# Patient Record
Sex: FEMALE | Race: WHITE | NOT HISPANIC OR LATINO | URBAN - METROPOLITAN AREA
[De-identification: names, ages, dates, MRNs, and addresses within clinical notes are randomized per-mention and may not be internally consistent; named-entity substitution may affect disease eponyms.]

---

## 2018-03-07 ENCOUNTER — OFFICE (OUTPATIENT)
Dept: URBAN - METROPOLITAN AREA CLINIC 75 | Facility: CLINIC | Age: 58
End: 2018-03-07
Payer: MEDICAID

## 2018-03-07 VITALS
DIASTOLIC BLOOD PRESSURE: 64 MMHG | HEART RATE: 72 BPM | HEIGHT: 61 IN | WEIGHT: 113 LBS | SYSTOLIC BLOOD PRESSURE: 102 MMHG

## 2018-03-07 DIAGNOSIS — Z12.11 ENCOUNTER FOR SCREENING FOR MALIGNANT NEOPLASM OF COLON: ICD-10-CM

## 2018-03-07 DIAGNOSIS — K62.3 RECTAL PROLAPSE: ICD-10-CM

## 2018-03-07 DIAGNOSIS — K59.00 CONSTIPATION, UNSPECIFIED: ICD-10-CM

## 2018-03-07 PROCEDURE — 99204 OFFICE O/P NEW MOD 45 MIN: CPT | Performed by: INTERNAL MEDICINE

## 2018-04-09 VITALS
DIASTOLIC BLOOD PRESSURE: 88 MMHG | DIASTOLIC BLOOD PRESSURE: 68 MMHG | SYSTOLIC BLOOD PRESSURE: 124 MMHG | WEIGHT: 113 LBS | SYSTOLIC BLOOD PRESSURE: 115 MMHG | HEART RATE: 81 BPM | RESPIRATION RATE: 16 BRPM | OXYGEN SATURATION: 98 % | HEART RATE: 72 BPM | RESPIRATION RATE: 19 BRPM | SYSTOLIC BLOOD PRESSURE: 103 MMHG | HEART RATE: 77 BPM | OXYGEN SATURATION: 97 % | HEART RATE: 73 BPM | HEART RATE: 87 BPM | RESPIRATION RATE: 18 BRPM | DIASTOLIC BLOOD PRESSURE: 77 MMHG | DIASTOLIC BLOOD PRESSURE: 80 MMHG | SYSTOLIC BLOOD PRESSURE: 118 MMHG | RESPIRATION RATE: 21 BRPM | DIASTOLIC BLOOD PRESSURE: 74 MMHG | OXYGEN SATURATION: 100 % | HEART RATE: 82 BPM | DIASTOLIC BLOOD PRESSURE: 82 MMHG | SYSTOLIC BLOOD PRESSURE: 116 MMHG | HEART RATE: 71 BPM | SYSTOLIC BLOOD PRESSURE: 109 MMHG | RESPIRATION RATE: 14 BRPM | TEMPERATURE: 97.4 F | DIASTOLIC BLOOD PRESSURE: 92 MMHG | HEART RATE: 80 BPM | OXYGEN SATURATION: 99 % | RESPIRATION RATE: 15 BRPM | SYSTOLIC BLOOD PRESSURE: 117 MMHG | RESPIRATION RATE: 20 BRPM | TEMPERATURE: 97.5 F | HEIGHT: 61 IN

## 2018-04-11 ENCOUNTER — AMBULATORY SURGICAL CENTER (OUTPATIENT)
Dept: URBAN - METROPOLITAN AREA SURGERY 17 | Facility: SURGERY | Age: 58
End: 2018-04-11
Payer: MEDICAID

## 2018-04-11 DIAGNOSIS — Z12.11 ENCOUNTER FOR SCREENING FOR MALIGNANT NEOPLASM OF COLON: ICD-10-CM

## 2018-04-11 PROCEDURE — 45378 DIAGNOSTIC COLONOSCOPY: CPT | Mod: 33,53 | Performed by: INTERNAL MEDICINE

## 2018-04-11 RX ADMIN — PROPOFOL 25 MG: 10 INJECTION, EMULSION INTRAVENOUS at 12:30

## 2018-04-11 RX ADMIN — PROPOFOL 25 MG: 10 INJECTION, EMULSION INTRAVENOUS at 12:20

## 2018-04-11 RX ADMIN — LIDOCAINE HYDROCHLORIDE 50 MG: 10 INJECTION, SOLUTION EPIDURAL; INFILTRATION; INTRACAUDAL; PERINEURAL at 12:17

## 2018-04-11 RX ADMIN — PROPOFOL 50 MG: 10 INJECTION, EMULSION INTRAVENOUS at 12:17

## 2018-04-11 RX ADMIN — PROPOFOL 25 MG: 10 INJECTION, EMULSION INTRAVENOUS at 12:22

## 2018-04-11 RX ADMIN — PROPOFOL 25 MG: 10 INJECTION, EMULSION INTRAVENOUS at 12:24

## 2018-04-11 NOTE — SERVICEHPINOTES
Thank you very much for allowing me to evaluate Ms. Scales. As you know she is a 57-year-old white female with severe mental impairment and cerebral palsy. She according to her caregiver in the past she's to have severe constipation, one point she was on lactulose, Dulcolax, fiber and enemas. Overtime she actually started having issues with increasingly smaller stools been loose stools. She was tapered off of her medication. She is now off of all laxatives and her caregiver says she has a formed stool typically every other day. With large bowel movements there is some blood on the outside of the stool but not mixed in the stool. There is no blood in the water does not drip. 2 weeks ago she tells me that look like when she went to the bathroom. Caregiver tells me look like her rectum "came out" and went back in. It sounds like she may have a prolapse. She's been having passage of large amounts of mucus at times. There is no known family history of polyps, colitis or colon cancer. She's never had colonoscopy.Her caregiver says she does have a history of reflux. She is on a puréed diet, she is a denture was. She tells me she has a good appetite. There is no history of rumination. There is no nausea or vomiting. There is no melena or hematemesis. She is not a smoker or drinker. She is nonverbal but does not appear to be in any acute distress. BR

## 2020-09-09 ENCOUNTER — OFFICE (OUTPATIENT)
Dept: URBAN - METROPOLITAN AREA CLINIC 75 | Facility: CLINIC | Age: 60
End: 2020-09-09
Payer: MEDICAID

## 2020-09-09 VITALS — HEIGHT: 61 IN | WEIGHT: 105 LBS | RESPIRATION RATE: 15 BRPM

## 2020-09-09 DIAGNOSIS — K62.3 RECTAL PROLAPSE: ICD-10-CM

## 2020-09-09 DIAGNOSIS — Z12.11 ENCOUNTER FOR SCREENING FOR MALIGNANT NEOPLASM OF COLON: ICD-10-CM

## 2020-09-09 DIAGNOSIS — K59.00 CONSTIPATION, UNSPECIFIED: ICD-10-CM

## 2020-09-09 DIAGNOSIS — K44.9 DIAPHRAGMATIC HERNIA WITHOUT OBSTRUCTION OR GANGRENE: ICD-10-CM

## 2020-09-09 DIAGNOSIS — R93.3 ABNORMAL FINDINGS ON DIAGNOSTIC IMAGING OF OTHER PARTS OF DI: ICD-10-CM

## 2020-09-09 PROCEDURE — 99214 OFFICE O/P EST MOD 30 MIN: CPT | Performed by: NURSE PRACTITIONER

## 2020-09-09 RX ORDER — LUBIPROSTONE 8 UG/1
CAPSULE, GELATIN COATED ORAL
Qty: 60 | Refills: 3 | Status: COMPLETED
Start: 2020-09-09 | End: 2022-03-10

## 2020-09-09 RX ORDER — POLYETHYLENE GLYCOL 3350 17 G/17G
17 POWDER, FOR SOLUTION ORAL
Qty: 1 | Refills: 11 | Status: COMPLETED
Start: 2020-09-09 | End: 2021-06-22

## 2021-01-20 ENCOUNTER — ON CAMPUS - OUTPATIENT (OUTPATIENT)
Dept: URBAN - METROPOLITAN AREA HOSPITAL 108 | Facility: HOSPITAL | Age: 61
End: 2021-01-20
Payer: MEDICAID

## 2021-01-20 DIAGNOSIS — R93.3 ABNORMAL FINDINGS ON DIAGNOSTIC IMAGING OF OTHER PARTS OF DI: ICD-10-CM

## 2021-01-20 PROCEDURE — 45331 SIGMOIDOSCOPY AND BIOPSY: CPT | Performed by: INTERNAL MEDICINE

## 2021-05-04 ENCOUNTER — INPATIENT HOSPITAL (OUTPATIENT)
Dept: URBAN - METROPOLITAN AREA HOSPITAL 107 | Facility: HOSPITAL | Age: 61
End: 2021-05-04
Payer: MEDICAID

## 2021-05-04 DIAGNOSIS — R93.3 ABNORMAL FINDINGS ON DIAGNOSTIC IMAGING OF OTHER PARTS OF DI: ICD-10-CM

## 2021-05-04 DIAGNOSIS — K56.7 ILEUS, UNSPECIFIED: ICD-10-CM

## 2021-05-04 DIAGNOSIS — R11.0 NAUSEA: ICD-10-CM

## 2021-05-04 DIAGNOSIS — K59.00 CONSTIPATION, UNSPECIFIED: ICD-10-CM

## 2021-05-04 PROCEDURE — 99222 1ST HOSP IP/OBS MODERATE 55: CPT | Performed by: INTERNAL MEDICINE

## 2021-05-05 ENCOUNTER — INPATIENT HOSPITAL (OUTPATIENT)
Dept: URBAN - METROPOLITAN AREA HOSPITAL 107 | Facility: HOSPITAL | Age: 61
End: 2021-05-05
Payer: MEDICAID

## 2021-05-05 DIAGNOSIS — K56.7 ILEUS, UNSPECIFIED: ICD-10-CM

## 2021-05-05 DIAGNOSIS — R19.4 CHANGE IN BOWEL HABIT: ICD-10-CM

## 2021-05-05 PROCEDURE — 99232 SBSQ HOSP IP/OBS MODERATE 35: CPT | Performed by: PHYSICIAN ASSISTANT

## 2021-05-06 ENCOUNTER — INPATIENT HOSPITAL (OUTPATIENT)
Dept: URBAN - METROPOLITAN AREA HOSPITAL 107 | Facility: HOSPITAL | Age: 61
End: 2021-05-06
Payer: MEDICAID

## 2021-05-06 DIAGNOSIS — K59.00 CONSTIPATION, UNSPECIFIED: ICD-10-CM

## 2021-05-06 DIAGNOSIS — K56.7 ILEUS, UNSPECIFIED: ICD-10-CM

## 2021-05-06 PROCEDURE — 99232 SBSQ HOSP IP/OBS MODERATE 35: CPT

## 2021-05-07 PROCEDURE — 99232 SBSQ HOSP IP/OBS MODERATE 35: CPT | Performed by: PHYSICIAN ASSISTANT

## 2021-05-08 ENCOUNTER — INPATIENT HOSPITAL (OUTPATIENT)
Dept: URBAN - METROPOLITAN AREA HOSPITAL 107 | Facility: HOSPITAL | Age: 61
End: 2021-05-08
Payer: MEDICAID

## 2021-05-08 DIAGNOSIS — R11.2 NAUSEA WITH VOMITING, UNSPECIFIED: ICD-10-CM

## 2021-05-08 DIAGNOSIS — D64.89 OTHER SPECIFIED ANEMIAS: ICD-10-CM

## 2021-05-08 DIAGNOSIS — K56.7 ILEUS, UNSPECIFIED: ICD-10-CM

## 2021-05-08 PROCEDURE — 99232 SBSQ HOSP IP/OBS MODERATE 35: CPT | Performed by: INTERNAL MEDICINE

## 2021-06-22 ENCOUNTER — OFFICE (OUTPATIENT)
Dept: URBAN - METROPOLITAN AREA CLINIC 75 | Facility: CLINIC | Age: 61
End: 2021-06-22
Payer: MEDICAID

## 2021-06-22 VITALS — WEIGHT: 110 LBS | HEIGHT: 61 IN

## 2021-06-22 DIAGNOSIS — K59.00 CONSTIPATION, UNSPECIFIED: ICD-10-CM

## 2021-06-22 DIAGNOSIS — K44.9 DIAPHRAGMATIC HERNIA WITHOUT OBSTRUCTION OR GANGRENE: ICD-10-CM

## 2021-06-22 DIAGNOSIS — R93.3 ABNORMAL FINDINGS ON DIAGNOSTIC IMAGING OF OTHER PARTS OF DI: ICD-10-CM

## 2021-06-22 DIAGNOSIS — K62.3 RECTAL PROLAPSE: ICD-10-CM

## 2021-06-22 DIAGNOSIS — Z12.11 ENCOUNTER FOR SCREENING FOR MALIGNANT NEOPLASM OF COLON: ICD-10-CM

## 2021-06-22 PROCEDURE — 99214 OFFICE O/P EST MOD 30 MIN: CPT | Performed by: NURSE PRACTITIONER

## 2021-06-22 RX ORDER — LACTULOSE 10 G/15ML
300 SOLUTION ORAL; RECTAL
Qty: 2700 | Refills: 11 | Status: COMPLETED
End: 2021-06-22 | Stop reason: SDUPTHER

## 2021-06-22 RX ORDER — LACTULOSE 10 G/15ML
900 SOLUTION ORAL; RECTAL
Qty: 2700 | Refills: 11 | Status: COMPLETED
Start: 2021-06-22 | End: 2022-03-10

## 2021-10-18 ENCOUNTER — TELEHEALTH PROVIDED OTHER THAN IN PATIENT'S HOME (OUTPATIENT)
Dept: URBAN - METROPOLITAN AREA TELEHEALTH 6 | Facility: TELEHEALTH | Age: 61
End: 2021-10-18
Payer: MEDICAID

## 2021-10-18 VITALS — HEIGHT: 61 IN | WEIGHT: 110 LBS

## 2021-10-18 DIAGNOSIS — K59.04 CHRONIC IDIOPATHIC CONSTIPATION: ICD-10-CM

## 2021-10-18 PROCEDURE — 99214 OFFICE O/P EST MOD 30 MIN: CPT | Mod: 95 | Performed by: NURSE PRACTITIONER

## 2022-03-10 ENCOUNTER — OFFICE (OUTPATIENT)
Dept: URBAN - METROPOLITAN AREA CLINIC 75 | Facility: CLINIC | Age: 62
End: 2022-03-10
Payer: MEDICAID

## 2022-03-10 VITALS — OXYGEN SATURATION: 98 % | HEART RATE: 65 BPM | HEIGHT: 61 IN | WEIGHT: 110 LBS

## 2022-03-10 DIAGNOSIS — K59.04 CHRONIC IDIOPATHIC CONSTIPATION: ICD-10-CM

## 2022-03-10 DIAGNOSIS — K44.9 DIAPHRAGMATIC HERNIA WITHOUT OBSTRUCTION OR GANGRENE: ICD-10-CM

## 2022-03-10 DIAGNOSIS — Z87.19 PERSONAL HISTORY OF OTHER DISEASES OF THE DIGESTIVE SYSTEM: ICD-10-CM

## 2022-03-10 DIAGNOSIS — R93.3 ABNORMAL FINDINGS ON DIAGNOSTIC IMAGING OF OTHER PARTS OF DI: ICD-10-CM

## 2022-03-10 PROCEDURE — 99214 OFFICE O/P EST MOD 30 MIN: CPT | Performed by: NURSE PRACTITIONER

## 2022-03-10 RX ORDER — LACTULOSE 10 G/15ML
SOLUTION ORAL; RECTAL
Qty: 2700 | Refills: 6 | Status: ACTIVE
Start: 2022-03-10

## 2022-03-10 RX ORDER — LUBIPROSTONE 8 UG/1
CAPSULE, GELATIN COATED ORAL
Qty: 60 | Refills: 3 | Status: COMPLETED
Start: 2020-09-09 | End: 2022-03-10

## 2022-03-10 RX ORDER — LACTULOSE 10 G/15ML
900 SOLUTION ORAL; RECTAL
Qty: 2700 | Refills: 11 | Status: COMPLETED
Start: 2021-06-22 | End: 2022-03-10

## 2022-03-10 RX ORDER — AMOXICILLIN 250 MG
CAPSULE ORAL
Qty: 60 | Refills: 3 | Status: ACTIVE
Start: 2022-03-10

## 2022-03-10 RX ORDER — POLYETHYLENE GLYCOL 3350 17 G/17G
POWDER, FOR SOLUTION ORAL
Qty: 2 | Refills: 11 | Status: COMPLETED
End: 2022-03-10

## 2022-03-10 RX ORDER — PRUCALOPRIDE 2 MG/1
2 TABLET, FILM COATED ORAL
Qty: 30 | Refills: 11 | Status: ACTIVE
Start: 2022-03-10

## 2022-04-22 ENCOUNTER — OFFICE (OUTPATIENT)
Dept: URBAN - METROPOLITAN AREA CLINIC 75 | Facility: CLINIC | Age: 62
End: 2022-04-22
Payer: MEDICAID

## 2022-04-22 VITALS
SYSTOLIC BLOOD PRESSURE: 104 MMHG | DIASTOLIC BLOOD PRESSURE: 62 MMHG | HEART RATE: 94 BPM | OXYGEN SATURATION: 96 % | HEIGHT: 61 IN | WEIGHT: 110 LBS

## 2022-04-22 DIAGNOSIS — R93.3 ABNORMAL FINDINGS ON DIAGNOSTIC IMAGING OF OTHER PARTS OF DI: ICD-10-CM

## 2022-04-22 DIAGNOSIS — Z87.19 PERSONAL HISTORY OF OTHER DISEASES OF THE DIGESTIVE SYSTEM: ICD-10-CM

## 2022-04-22 DIAGNOSIS — K59.04 CHRONIC IDIOPATHIC CONSTIPATION: ICD-10-CM

## 2022-04-22 PROCEDURE — 99214 OFFICE O/P EST MOD 30 MIN: CPT | Performed by: NURSE PRACTITIONER

## 2022-04-22 RX ORDER — PRUCALOPRIDE 2 MG/1
2 TABLET, FILM COATED ORAL
Qty: 30 | Refills: 11 | Status: ACTIVE
Start: 2022-03-10

## 2022-04-22 RX ORDER — LINACLOTIDE 290 UG/1
CAPSULE, GELATIN COATED ORAL
Qty: 30 | Refills: 5 | Status: COMPLETED
Start: 2021-01-14 | End: 2022-04-22

## 2022-04-22 RX ORDER — LACTULOSE 10 G/15ML
SOLUTION ORAL; RECTAL
Qty: 2700 | Refills: 6 | Status: ACTIVE
Start: 2022-03-10

## 2022-04-22 RX ORDER — MINERAL OIL 100 G/100ML
ENEMA RECTAL
Qty: 14 | Refills: 6 | Status: COMPLETED
Start: 2021-10-29 | End: 2022-12-14

## 2022-04-22 RX ORDER — LUBIPROSTONE 24 UG/1
CAPSULE, GELATIN COATED ORAL
Qty: 30 | Refills: 12 | Status: ACTIVE

## 2022-04-22 RX ORDER — AMOXICILLIN 250 MG
CAPSULE ORAL
Qty: 60 | Refills: 3 | Status: ACTIVE
Start: 2022-03-10

## 2022-05-23 ENCOUNTER — ON CAMPUS - OUTPATIENT (OUTPATIENT)
Dept: URBAN - METROPOLITAN AREA HOSPITAL 108 | Facility: HOSPITAL | Age: 62
End: 2022-05-23
Payer: MEDICAID

## 2022-05-23 DIAGNOSIS — K62.6 ULCER OF ANUS AND RECTUM: ICD-10-CM

## 2022-05-23 DIAGNOSIS — R93.3 ABNORMAL FINDINGS ON DIAGNOSTIC IMAGING OF OTHER PARTS OF DI: ICD-10-CM

## 2022-05-23 PROCEDURE — 45330 DIAGNOSTIC SIGMOIDOSCOPY: CPT | Performed by: INTERNAL MEDICINE

## 2022-12-14 ENCOUNTER — TELEHEALTH PROVIDED IN PATIENT'S HOME (OUTPATIENT)
Dept: URBAN - METROPOLITAN AREA TELEHEALTH 6 | Facility: TELEHEALTH | Age: 62
End: 2022-12-14
Payer: MEDICAID

## 2022-12-14 VITALS — WEIGHT: 115 LBS | HEIGHT: 61 IN

## 2022-12-14 DIAGNOSIS — K44.9 DIAPHRAGMATIC HERNIA WITHOUT OBSTRUCTION OR GANGRENE: ICD-10-CM

## 2022-12-14 DIAGNOSIS — R93.3 ABNORMAL FINDINGS ON DIAGNOSTIC IMAGING OF OTHER PARTS OF DI: ICD-10-CM

## 2022-12-14 PROCEDURE — 99214 OFFICE O/P EST MOD 30 MIN: CPT | Performed by: NURSE PRACTITIONER

## 2022-12-14 RX ORDER — PRUCALOPRIDE 2 MG/1
2 TABLET, FILM COATED ORAL
Qty: 30 | Refills: 11 | Status: ACTIVE
Start: 2022-03-10

## 2022-12-14 RX ORDER — LUBIPROSTONE 24 UG/1
CAPSULE, GELATIN COATED ORAL
Qty: 30 | Refills: 12 | Status: ACTIVE

## 2022-12-14 RX ORDER — MINERAL OIL 100 G/100ML
ENEMA RECTAL
Qty: 14 | Refills: 6 | Status: COMPLETED
Start: 2021-10-29 | End: 2022-12-14

## 2022-12-14 RX ORDER — LACTULOSE 10 G/15ML
SOLUTION ORAL; RECTAL
Qty: 2838 | Refills: 12 | Status: ACTIVE